# Patient Record
(demographics unavailable — no encounter records)

---

## 2024-10-31 NOTE — REVIEW OF SYSTEMS
[Negative] : Heme/Lymph [Recent Weight Loss (___ Lbs)] : recent [unfilled] ~Ulb weight loss [Hoarseness] : hoarseness [Heart Rate Is Slow] : slow heart rate [Palpitations] : palpitations [Breast Lump] : breast lump [Depression] : depression